# Patient Record
Sex: MALE | Race: OTHER | Employment: FULL TIME | URBAN - METROPOLITAN AREA
[De-identification: names, ages, dates, MRNs, and addresses within clinical notes are randomized per-mention and may not be internally consistent; named-entity substitution may affect disease eponyms.]

---

## 2020-11-22 ENCOUNTER — HOSPITAL ENCOUNTER (EMERGENCY)
Age: 20
Discharge: HOME | End: 2020-11-22
Payer: COMMERCIAL

## 2020-11-22 ENCOUNTER — APPOINTMENT (OUTPATIENT)
Dept: GENERAL RADIOLOGY | Age: 20
End: 2020-11-22
Payer: COMMERCIAL

## 2020-11-22 VITALS
SYSTOLIC BLOOD PRESSURE: 124 MMHG | BODY MASS INDEX: 25.11 KG/M2 | OXYGEN SATURATION: 96 % | HEIGHT: 67 IN | DIASTOLIC BLOOD PRESSURE: 56 MMHG | TEMPERATURE: 98 F | RESPIRATION RATE: 16 BRPM | HEART RATE: 101 BPM | WEIGHT: 160 LBS

## 2020-11-22 PROCEDURE — 99282 EMERGENCY DEPT VISIT SF MDM: CPT

## 2020-11-22 PROCEDURE — 73630 X-RAY EXAM OF FOOT: CPT

## 2020-11-22 PROCEDURE — 73610 X-RAY EXAM OF ANKLE: CPT

## 2020-11-22 PROCEDURE — 99283 EMERGENCY DEPT VISIT LOW MDM: CPT

## 2020-11-22 RX ORDER — NAPROXEN 500 MG/1
500 TABLET ORAL 2 TIMES DAILY
Qty: 60 TABLET | Refills: 0 | Status: SHIPPED | OUTPATIENT
Start: 2020-11-22 | End: 2021-01-23 | Stop reason: ALTCHOICE

## 2020-11-22 ASSESSMENT — PAIN SCALES - GENERAL: PAINLEVEL_OUTOF10: 4

## 2020-11-22 ASSESSMENT — PAIN DESCRIPTION - LOCATION: LOCATION: ANKLE

## 2020-11-22 ASSESSMENT — PAIN DESCRIPTION - ORIENTATION: ORIENTATION: RIGHT

## 2020-11-22 ASSESSMENT — PAIN DESCRIPTION - PAIN TYPE: TYPE: ACUTE PAIN

## 2020-11-22 NOTE — ED PROVIDER NOTES
[facility]     Department of Emergency Medicine   ED  Provider Note  Admit Date/RoomTime: 11/22/2020  6:09 PM  ED Room: 33/    Chief Complaint   Ankle Injury (twisted right ankle yesterday. pt states the pain isn't so bad \"because of the liquor\". pt reports drinking before coming to ED. )    History of Present Illness      Nela Chowdary is a 23 y.o. old male who presents to the emergency department for right ankle pain. Patient states he twisted his right ankle after stepping on uneven pavement yesterday. He states the pain \"is not that bad because I drink a bunch of liquor on the way here. \"He denies any numbness/tingling or sensation changes. Patient has Aircast on his ankle from previous injury. He states it is helping. After removal of Aircast, patient does have mild edema and tenderness to lateral aspect of right ankle. He has intact sensations distally with full range of motion of toes. He denies any calf tenderness or swelling, knee pain, hip pain, or back pain. Patient is alert and oriented x3 and in no apparent distress at this exam.  He is nontoxic-appearing. ROS   Pertinent positives and negatives are stated within HPI, all other systems reviewed and are negative. Past Medical History: No past medical history on file. Past Surgical History:  has no past surgical history on file. Social History:    Family History: family history is not on file. Allergies: Patient has no known allergies. Physical Exam     Vitals:    11/22/20 1742 11/22/20 1808   BP:  (!) 124/56   Pulse: 101    Resp: 16    Temp: 98 °F (36.7 °C)    TempSrc: Infrared    SpO2: 96%    Weight: 160 lb (72.6 kg)    Height: 5' 6.5\" (1.689 m)    Oxygen Saturation Interpretation: Normal.    Constitutional:  Alert, development consistent with age. NAD  HEENT:  NC/NT. Airway patent. Neck:  Normal ROM. Supple. Non-tender  Back: No lumbar tenderness. Lower Extremity:  Right: ankle.               Tenderness: Mild to lateral aspect             Swelling: Mild to lateral aspect and dorsum of foot             Deformity: No.             ROM: diminished range with pain. Skin:  no erythema, rash or open wounds noted, compartments soft and compressible       Distal Function:              Motor deficit: none. Sensory deficit: none. Pulse deficit: none. Capillary refill: normal.  Integument:  Normal turgor. Warm, dry, without visible rash, unless noted elsewhere. Neurological: Motor functions intact. Lab / Imaging Results   (All laboratory and radiology results have been personally reviewed by myself)  Labs:  No results found for this visit on 11/22/20. Imaging: All Radiology results interpreted by Radiologist unless otherwise noted. XR ANKLE RIGHT (MIN 3 VIEWS)   Final Result   No definite radiographically visible acute skeletal pathology in the right   ankle and right foot. XR FOOT RIGHT (MIN 3 VIEWS)   Final Result   No definite radiographically visible acute skeletal pathology in the right   ankle and right foot. ED Course / Medical Decision Making   Medications - No data to display     Consult(s):  None    Procedure(s):  none    MDM:       Counseling: The emergency provider has spoken with the patient or caregiver and discussed todays results, in addition to providing specific details for the plan of care and counseling regarding the diagnosis and prognosis. Questions are answered at this time and they are agreeable with the plan. Patient understands they must follow-up with PCP and/or orthopedics. RICE discussed. They were educated on signs and symptoms that would require emergent return. Patient was educated on newly prescribed medications. They were also instructed on ace wrap use and care. Continue use of Aircast    Assessment      1.  Sprain of right ankle, unspecified ligament, initial encounter      Plan   Discharge to home  Patient condition is good    New Medications     Discharge Medication List as of 11/22/2020  6:49 PM      START taking these medications    Details   naproxen (NAPROSYN) 500 MG tablet Take 1 tablet by mouth 2 times daily, Disp-60 tablet,R-0Print             Electronically signed by Kathrin House PA-C   DD: 11/22/20  **This report was transcribed using voice recognition software. Every effort was made to ensure accuracy; however, inadvertent computerized transcription errors may be present.     END OF ED PROVIDER NOTE        Kathrin House PA-C  11/22/20 [id number]

## 2021-01-23 ENCOUNTER — HOSPITAL ENCOUNTER (EMERGENCY)
Age: 21
Discharge: HOME | End: 2021-01-23
Payer: COMMERCIAL

## 2021-01-23 VITALS
SYSTOLIC BLOOD PRESSURE: 127 MMHG | WEIGHT: 165 LBS | RESPIRATION RATE: 18 BRPM | OXYGEN SATURATION: 98 % | BODY MASS INDEX: 26.52 KG/M2 | TEMPERATURE: 98.5 F | HEIGHT: 66 IN | DIASTOLIC BLOOD PRESSURE: 58 MMHG | HEART RATE: 67 BPM

## 2021-01-23 DIAGNOSIS — S61.219A LACERATION OF FINGER OF RIGHT HAND, INITIAL ENCOUNTER: Primary | ICD-10-CM

## 2021-01-23 PROCEDURE — 6370000000 HC RX 637 (ALT 250 FOR IP)

## 2021-01-23 PROCEDURE — 6360000002 HC RX W HCPCS: Performed by: PHYSICIAN ASSISTANT

## 2021-01-23 PROCEDURE — 2500000003 HC RX 250 WO HCPCS: Performed by: PHYSICIAN ASSISTANT

## 2021-01-23 PROCEDURE — 99283 EMERGENCY DEPT VISIT LOW MDM: CPT

## 2021-01-23 PROCEDURE — 90715 TDAP VACCINE 7 YRS/> IM: CPT | Performed by: PHYSICIAN ASSISTANT

## 2021-01-23 PROCEDURE — 90471 IMMUNIZATION ADMIN: CPT | Performed by: PHYSICIAN ASSISTANT

## 2021-01-23 PROCEDURE — 12002 RPR S/N/AX/GEN/TRNK2.6-7.5CM: CPT

## 2021-01-23 RX ORDER — ONDANSETRON 4 MG/1
TABLET, ORALLY DISINTEGRATING ORAL
Status: COMPLETED
Start: 2021-01-23 | End: 2021-01-23

## 2021-01-23 RX ORDER — CEPHALEXIN 500 MG/1
500 CAPSULE ORAL 4 TIMES DAILY
Qty: 20 CAPSULE | Refills: 0 | Status: SHIPPED | OUTPATIENT
Start: 2021-01-23 | End: 2021-01-28

## 2021-01-23 RX ORDER — LIDOCAINE HYDROCHLORIDE 10 MG/ML
20 INJECTION, SOLUTION INFILTRATION; PERINEURAL ONCE
Status: COMPLETED | OUTPATIENT
Start: 2021-01-23 | End: 2021-01-23

## 2021-01-23 RX ORDER — ONDANSETRON 4 MG/1
4 TABLET, ORALLY DISINTEGRATING ORAL ONCE
Status: COMPLETED | OUTPATIENT
Start: 2021-01-23 | End: 2021-01-23

## 2021-01-23 RX ORDER — NAPROXEN 500 MG/1
500 TABLET ORAL 2 TIMES DAILY
Qty: 60 TABLET | Refills: 0 | Status: SHIPPED | OUTPATIENT
Start: 2021-01-23 | End: 2021-01-31 | Stop reason: ALTCHOICE

## 2021-01-23 RX ADMIN — LIDOCAINE HYDROCHLORIDE 20 ML: 10 INJECTION, SOLUTION INFILTRATION; PERINEURAL at 20:46

## 2021-01-23 RX ADMIN — ONDANSETRON 4 MG: 4 TABLET, ORALLY DISINTEGRATING ORAL at 20:59

## 2021-01-23 RX ADMIN — TETANUS TOXOID, REDUCED DIPHTHERIA TOXOID AND ACELLULAR PERTUSSIS VACCINE, ADSORBED 0.5 ML: 5; 2.5; 8; 8; 2.5 SUSPENSION INTRAMUSCULAR at 20:46

## 2021-01-24 NOTE — ED PROVIDER NOTES
[facility]     Department of Emergency Medicine   ED  Provider Note  Admit Date/RoomTime: 1/23/2021  8:08 PM  ED Room: /     Chief Complaint   Laceration (right hand from furniture strap at work )    History of Present Illness      Kami Abarca is a 23 y.o. old male presenting to the emergency department for a laceration to the right hand caused by a furniture strap. Patient states this injury occurred about 5 hours ago. He states he works in [employer] and finished his shift and then drove home to get evaluated. Patient has full range of motion of all fingers of affected extremity. He denies any numbness/tingling or sensation changes. He is full range of motion of wrist and elbow. He is unsure of his last tetanus and will be updated at today's visit. Patient has no other complaints or concerns at this ED visit. He is alert and oriented x3 and in no apparent distress at this exam.    The patients tetanus status is unknown. He will be updated at today's visit        ROS   Pertinent positives and negatives are stated within HPI, all other systems reviewed and are negative. Past Medical History:  has no past medical history on file. Past Surgical History:  has no past surgical history on file. Social History:      Family History: family history is not on file. Allergies: Patient has no known allergies. Allergies reviewed with patient     Physical Exam   VS:  BP (!) 127/58   Pulse 67   Temp 98.5 °F (36.9 °C)   Resp 18   Ht 5' 6\" (1.676 m)   Wt 165 lb (74.8 kg)   SpO2 98%   BMI 26.63 kg/m²      Oxygen Saturation Interpretation: Normal.    Constitutional:  Alert and oriented x4, development consistent with age. HEENT:  NC/NT. Airway patent. PERRLA, EOMI   Neck:  Normal ROM. Supple. Non-tender.   Extremities: 3.5 centimeter laceration to thenar region of right hand, intact sensations distally, full range of motion of all fingers and wrist, capillary refill less than 3 seconds x 5 fingers, mild active oozing, no tendon injury or foreign bodies noted, mild exposure of adipose tissue  Lymphatics: No lymphangitis or adenopathy noted. Neurological: CN II-XII grossly intact, Motor functions intact. Lab / Imaging Results   (All laboratory and radiology results have been personally reviewed by myself)  Labs:  No results found for this visit on 01/23/21. Imaging: All Radiology results interpreted by Radiologist unless otherwise noted. No orders to display       ED Course / Medical Decision Making     Medications   lidocaine 1 % injection 20 mL (20 mLs Intradermal Given 1/23/21 2046)   Tetanus-Diphth-Acell Pertussis (BOOSTRIX) injection 0.5 mL (0.5 mLs Intramuscular Given 1/23/21 2046)   ondansetron (ZOFRAN-ODT) disintegrating tablet 4 mg (4 mg Oral Given 1/23/21 2059)     Consult(s):  None    Procedure(s):  PROCEDURE NOTE      LACERATION REPAIR  Risks, benefits and alternatives (for applicable procedures below) described. Performed By: Faviola Hernandez PA-C. Laceration #: 1. Location: right hand  Length: 3.5cm. The wound area was irrigated with sterile saline, cleansed with povidone iodine, cleansend with shur-clens and draped in a sterile fashion. Local Anesthesia:  Lidocaine 1% without epinephrine. The wound was explored with the following results:  no foreign body or tendon injury seen. Flaps Aligned: yes. The wound was closed with 5-0 Ethilon using interrupted sutures. Dressing:  bacitracin and a sterile dressing was placed. Total number suture:  6. There were no additional lacerations requiring repair. MDM:     Counseling: The emergency provider has spoken with the patient/caregiver and discussed todays results, in addition to providing specific details for the plan of care and counseling regarding the diagnosis and prognosis. Questions are answered at this time and they are agreeable with the plan. Pt was educated on signs of infection and wound care.  Pt was advised to return to ED if signs of infection or fever develop. Pt understands they must follow-up with PCP for suture removal and/or wound check. Pt remained nontoxic, afebrile, and A&O x3 during this ED visit. They agreed with plan of care, discharge, and importance of follow-up. Pt was in no distress at discharge. Vitals stable. All results reviewed with pt and all questions answered. Patient was educated on newly prescribed medication. Patient educated on wound care. Assessment     1. Laceration of finger of right hand, initial encounter      Plan   Discharge to home  Patient condition is good    New Medications     New Prescriptions    CEPHALEXIN (KEFLEX) 500 MG CAPSULE    Take 1 capsule by mouth 4 times daily for 5 days    NAPROXEN (NAPROSYN) 500 MG TABLET    Take 1 tablet by mouth 2 times daily       Electronically signed by Rhonda Sevilla PA-C   DD: 1/23/21  **This report was transcribed using voice recognition software. Every effort was made to ensure accuracy; however, inadvertent computerized transcription errors may be present.     END OF ED PROVIDER NOTE        Rhonda Sevilla PA-C  01/23/21 [id number]

## 2021-01-31 ENCOUNTER — HOSPITAL ENCOUNTER (EMERGENCY)
Age: 21
Discharge: HOME | End: 2021-01-31
Payer: MEDICAID

## 2021-01-31 VITALS
SYSTOLIC BLOOD PRESSURE: 119 MMHG | HEIGHT: 67 IN | TEMPERATURE: 98 F | BODY MASS INDEX: 25.11 KG/M2 | WEIGHT: 160 LBS | DIASTOLIC BLOOD PRESSURE: 53 MMHG | OXYGEN SATURATION: 98 % | HEART RATE: 75 BPM | RESPIRATION RATE: 14 BRPM

## 2021-01-31 DIAGNOSIS — Z48.02 VISIT FOR SUTURE REMOVAL: Primary | ICD-10-CM

## 2021-01-31 PROCEDURE — 99284 EMERGENCY DEPT VISIT MOD MDM: CPT

## 2021-01-31 NOTE — ED PROVIDER NOTES
[address] [place] [address]  Department of Emergency Medicine   ED  Encounter Note  Admit Date/RoomTime: 2021 11:06 AM  ED Room: 34/34    NAME: José Antonio Butcher  : 2000  MRN: [id number]     Chief Complaint:  Suture / Staple Removal (rt hand)    History of Present Illness       José Antonio Butcher is a 23 y.o. old male who presents to the emergency department by private vehicle for removal of sutures from right hand, which were placed 21 prior to arrival at an emergency department. Current antibiotic use: None. Tetanus Status:  up to date. Associated Symptoms:     []   Pain      []   Swelling      []   Redness      []   Drainage      []   Bleeding      []   Fever/Chills     ROS   Pertinent positives and negatives are stated within HPI, all other systems reviewed and are negative. Past Medical History:  has a past medical history of ADHD and Asthma. Surgical History:  has no past surgical history on file. Social History:  reports that he has never smoked. He has never used smokeless tobacco. He reports current alcohol use. Drug: Marijuana. Family History: family history is not on file. Allergies: Patient has no known allergies. Physical Exam   Oxygen Saturation Interpretation: Normal.        ED Triage Vitals [21 1104]   BP Temp Temp Source Pulse Resp SpO2 Height Weight   (!) 119/53 98 °F (36.7 °C) Temporal 75 14 98 % 5' 6.5\" (1.689 m) 160 lb (72.6 kg)         Constitutional:  Alert, development consistent with age. HEENT:  NC/NT. Airway patent. Neck:  Normal ROM. Supple. Respiratory:  Clear to auscultation and breath sounds equal.    CV: Regular rate and rhythm, normal heart sounds, without pathological murmurs, ectopy, gallops, or rubs. GI:  Abdomen Soft, nontender, good bowel sounds. No firm or pulsatile mass. Integument:  No rashes, erythema present. Lymphatics: No lymphangitis or adenopathy noted.   Extremities:  Right hand:        Neurological:  Oriented. Motor functions intact. Lab / Imaging Results   (All laboratory and radiology results have been personally reviewed by myself)  Labs:  No results found for this visit on 01/31/21. Imaging: All Radiology results interpreted by Radiologist unless otherwise noted. No orders to display       ED Course / Medical Decision Making   Medications - No data to display     Consult(s):   None    Procedure(s):   none    MDM:     PROCEDURE NOTE  1/31/21         SUTURE/STAPLE REMOVAL  Risks, benefits and alternatives (for applicable procedures below) described. Performed By: DRU Hu. Indication:  Wound healed. Informed consent obtained: The patient provided verbal consent for this procedure. .  Location: hand. Procedure:  6 sutures were removed without difficulty. Complications:  None. Wound care instructions provided. Patient/guardian was instructed to be alert for any signs of cutaneous infection. Plan of Care/Counseling:  I reviewed today's visit with the patient in addition to providing specific details for the plan of care and counseling regarding the diagnosis and prognosis. Questions are answered at this time and are agreeable with the plan. Assessment     1. Visit for suture removal      Plan   Discharge home. Patient condition is good    New Medications     New Prescriptions    No medications on file     Electronically signed by DRU Hu   DD: 1/31/21  **This report was transcribed using voice recognition software. Every effort was made to ensure accuracy; however, inadvertent computerized transcription errors may be present.   END OF ED PROVIDER NOTE       Italo Membreno  01/31/21 1129